# Patient Record
(demographics unavailable — no encounter records)

---

## 2025-06-13 NOTE — HISTORY OF PRESENT ILLNESS
[de-identified] : Initial Visit for Left knee pain  Reason:  no injury  Duration: 3 weeks  Prior Studies: X rays ordered  Medical Hx: Surgical Hx: N/A Aggravating FX: prolonged standing / sit to stand/ walking  Symptoms: Sharp  Pain Level: 8/10 Alleviating FX: Ice / heat  Pain Medication: none Current Med: none  Allergies: NKA

## 2025-06-13 NOTE — HISTORY OF PRESENT ILLNESS
[de-identified] : Initial Visit for Left knee pain  Reason:  no injury  Duration: 3 weeks  Prior Studies: X rays ordered  Medical Hx: Surgical Hx: N/A Aggravating FX: prolonged standing / sit to stand/ walking  Symptoms: Sharp  Pain Level: 8/10 Alleviating FX: Ice / heat  Pain Medication: none Current Med: none  Allergies: NKA

## 2025-06-13 NOTE — ASSESSMENT
[FreeTextEntry1] : Discussed at length with patient regarding symptoms and diagnosis.  Treatment options discussed and patient's questions answered and patient expresses understanding.  Patient offered physical therapy but declined as well as home exercises.  Patient to follow-up in office if persistent or recurrent symptoms.  Patient instructed to call if any questions or concerns.

## 2025-06-13 NOTE — PHYSICAL EXAM
[de-identified] : Left knee  Constitutional:  The patient is healthy-appearing and in no apparent distress.   Gait: The patient ambulates with a normal gait and no limp.  Cardiovascular System:  The capillary refill is less than 2 seconds.   Skin:  There are no skin abnormalities.  Left Knee:   Bony Palpation:  There is no tenderness of the medial joint line.  There is no tenderness of the lateral joint line. There is no tenderness of the medial femoral chondyle. There is no tenderness of the lateral femoral chondyle. There is no tenderness of the tibial tubercle. There is no tenderness of the superior patella. There is no tenderness of the inferior patella. There is tenderness of the medial patellar facet. There is tenderness of the lateral patellar facet.  Soft Tissue Palpation:  There is no tenderness of the medial retinaculum. There is no tenderness of the lateral retinaculum. There is no tenderness of the quadriceps tendon. There is no tenderness of the patella tendon. There is no tenderness of the ITB. There is no tenderness of the pes anserine.  Active Range of Motion:  The range of motion at the knee actively and passively is full.   Special Tests:  There is a negative Apley. There is a negative Steinmanns.  There is a negative Lachman and Anterior Drawer. There is a negative Posterior Drawer.   There is no varus or valgus laxity.  Strength:  There is 5/5 hip flexion and 5/5 knee flexion and extension.    Psychiatric:  The patient demonstrates a normal mood and affect and is active and alert.   [de-identified] : X-ray left knee 3 view:  There is no arthritis or fracture

## 2025-06-13 NOTE — PHYSICAL EXAM
[de-identified] : Left knee  Constitutional:  The patient is healthy-appearing and in no apparent distress.   Gait: The patient ambulates with a normal gait and no limp.  Cardiovascular System:  The capillary refill is less than 2 seconds.   Skin:  There are no skin abnormalities.  Left Knee:   Bony Palpation:  There is no tenderness of the medial joint line.  There is no tenderness of the lateral joint line. There is no tenderness of the medial femoral chondyle. There is no tenderness of the lateral femoral chondyle. There is no tenderness of the tibial tubercle. There is no tenderness of the superior patella. There is no tenderness of the inferior patella. There is tenderness of the medial patellar facet. There is tenderness of the lateral patellar facet.  Soft Tissue Palpation:  There is no tenderness of the medial retinaculum. There is no tenderness of the lateral retinaculum. There is no tenderness of the quadriceps tendon. There is no tenderness of the patella tendon. There is no tenderness of the ITB. There is no tenderness of the pes anserine.  Active Range of Motion:  The range of motion at the knee actively and passively is full.   Special Tests:  There is a negative Apley. There is a negative Steinmanns.  There is a negative Lachman and Anterior Drawer. There is a negative Posterior Drawer.   There is no varus or valgus laxity.  Strength:  There is 5/5 hip flexion and 5/5 knee flexion and extension.    Psychiatric:  The patient demonstrates a normal mood and affect and is active and alert.   [de-identified] : X-ray left knee 3 view:  There is no arthritis or fracture

## 2025-06-19 NOTE — PHYSICAL EXAM
[de-identified] : Left hip  Constitutional:  The patient is healthy-appearing and in no apparent distress.   Gait: The patient ambulates with a normal gait and no limp.  Cardiovascular System:  There is capillary refill less than 2 seconds.   Skin:  There is no skin abnormalities.  Lumbar Spine; There is no significance malalignment and no tenderness to the paraspinal musculature.  Left Hips:   Bony Palpation:  There is no tenderness of the iliac crest. There is no tenderness of the ASIS. There is no tenderness of the PSIS. There is no tenderness of the SI joint. There is no tenderness of the greater trochanter.   Soft Tissue Palpation:  There is no tenderness of the hip adductors. There is no tenderness of the hip abductors. There is no tenderness of the hamstrings.  There is no tenderness of the piriformis.  There is tenderness of the hip flexors.  Active Range of Motion:  There is full range of motion at the hip actively and passively.   Special Tests:  There is a negative Dara's test. There is a negative Michael-Stewart test.   Strength:  There is 5/5 hip flexion, adduction, abduction.    Psychiatric:  The patient demonstrates a normal mood and affect and is active and alert. [de-identified] : Insert x-ray left hip 2 view: There is no significant bony / soft tissue abnormality, arthritis, or fracture. Insert x-ray lumbar spine 2 view: There is mild L4-5 foraminal stenosis and loss of lordosis otherwise no significant abnormality

## 2025-06-19 NOTE — HISTORY OF PRESENT ILLNESS
[de-identified] : Patient is an established patient presenting with several months of left anterior hip discomfort and clicking he states has been doing home exercise and physical therapy for several months as he is a  with no significant improvement

## 2025-06-19 NOTE — PHYSICAL EXAM
[de-identified] : Left hip  Constitutional:  The patient is healthy-appearing and in no apparent distress.   Gait: The patient ambulates with a normal gait and no limp.  Cardiovascular System:  There is capillary refill less than 2 seconds.   Skin:  There is no skin abnormalities.  Lumbar Spine; There is no significance malalignment and no tenderness to the paraspinal musculature.  Left Hips:   Bony Palpation:  There is no tenderness of the iliac crest. There is no tenderness of the ASIS. There is no tenderness of the PSIS. There is no tenderness of the SI joint. There is no tenderness of the greater trochanter.   Soft Tissue Palpation:  There is no tenderness of the hip adductors. There is no tenderness of the hip abductors. There is no tenderness of the hamstrings.  There is no tenderness of the piriformis.  There is tenderness of the hip flexors.  Active Range of Motion:  There is full range of motion at the hip actively and passively.   Special Tests:  There is a negative Dara's test. There is a negative Michael-Stewart test.   Strength:  There is 5/5 hip flexion, adduction, abduction.    Psychiatric:  The patient demonstrates a normal mood and affect and is active and alert. [de-identified] : Insert x-ray left hip 2 view: There is no significant bony / soft tissue abnormality, arthritis, or fracture. Insert x-ray lumbar spine 2 view: There is mild L4-5 foraminal stenosis and loss of lordosis otherwise no significant abnormality

## 2025-06-19 NOTE — REASON FOR VISIT
[Follow-Up Visit] : a follow-up visit for [FreeTextEntry2] : Last Visit: 6/12/25 Reason: new reason :left hip pain Symptoms: clicking while putting legs together, discomfort, sensitivity Pain Level: 8/10 while in movement 1/10 when sitting still Physical therapy/ Home exercises: gym exercises

## 2025-06-19 NOTE — PHYSICAL EXAM
[de-identified] : Left hip  Constitutional:  The patient is healthy-appearing and in no apparent distress.   Gait: The patient ambulates with a normal gait and no limp.  Cardiovascular System:  There is capillary refill less than 2 seconds.   Skin:  There is no skin abnormalities.  Lumbar Spine; There is no significance malalignment and no tenderness to the paraspinal musculature.  Left Hips:   Bony Palpation:  There is no tenderness of the iliac crest. There is no tenderness of the ASIS. There is no tenderness of the PSIS. There is no tenderness of the SI joint. There is no tenderness of the greater trochanter.   Soft Tissue Palpation:  There is no tenderness of the hip adductors. There is no tenderness of the hip abductors. There is no tenderness of the hamstrings.  There is no tenderness of the piriformis.  There is tenderness of the hip flexors.  Active Range of Motion:  There is full range of motion at the hip actively and passively.   Special Tests:  There is a negative Dara's test. There is a negative Michael-Stewart test.   Strength:  There is 5/5 hip flexion, adduction, abduction.    Psychiatric:  The patient demonstrates a normal mood and affect and is active and alert. [de-identified] : Insert x-ray left hip 2 view: There is no significant bony / soft tissue abnormality, arthritis, or fracture. Insert x-ray lumbar spine 2 view: There is mild L4-5 foraminal stenosis and loss of lordosis otherwise no significant abnormality

## 2025-06-19 NOTE — HISTORY OF PRESENT ILLNESS
[de-identified] : Patient is an established patient presenting with several months of left anterior hip discomfort and clicking he states has been doing home exercise and physical therapy for several months as he is a  with no significant improvement

## 2025-06-19 NOTE — HISTORY OF PRESENT ILLNESS
[de-identified] : Patient is an established patient presenting with several months of left anterior hip discomfort and clicking he states has been doing home exercise and physical therapy for several months as he is a  with no significant improvement

## 2025-07-14 NOTE — PHYSICAL EXAM
[de-identified] : Left hip  Constitutional:  The patient is healthy-appearing and in no apparent distress.   Gait: The patient ambulates with a normal gait and no limp.  Cardiovascular System:  There is capillary refill less than 2 seconds.   Skin:  There is no skin abnormalities.  Lumbar Spine; There is no significance malalignment and no tenderness to the paraspinal musculature.  Left Hips:   Bony Palpation:  There is no tenderness of the iliac crest. There is no tenderness of the ASIS. There is no tenderness of the PSIS. There is no tenderness of the SI joint. There is no tenderness of the greater trochanter.   Soft Tissue Palpation:  There is no tenderness of the hip adductors. There is no tenderness of the hip abductors. There is no tenderness of the hamstrings.  There is no tenderness of the piriformis.  There is tenderness of the hip flexors.  Active Range of Motion:  There is full range of motion at the hip actively and passively.   Special Tests:  There is a negative Dara's test. There is a negative Michael-Stewart test.   Strength:  There is 5/5 hip flexion, adduction, abduction.    Left knee  Gait: The patient ambulates with a normal gait and no limp.  Cardiovascular System:  The capillary refill is less than 2 seconds.   Skin:  There are no skin abnormalities.  Left Knee:   Bony Palpation:  There is no tenderness of the medial joint line.  There is no tenderness of the lateral joint line. There is no tenderness of the medial femoral chondyle. There is no tenderness of the lateral femoral chondyle. There is no tenderness of the tibial tubercle. There is no tenderness of the superior patella. There is no tenderness of the inferior patella. There is tenderness of the medial patellar facet. There is tenderness of the lateral patellar facet.  Soft Tissue Palpation:  There is no tenderness of the medial retinaculum. There is no tenderness of the lateral retinaculum. There is no tenderness of the quadriceps tendon. There is no tenderness of the patella tendon. There is no tenderness of the ITB. There is no tenderness of the pes anserine.  Active Range of Motion:  The range of motion at the knee actively and passively is full.   Special Tests:  There is a negative Apley. There is a negative Steinmanns.  There is a negative Lachman and Anterior Drawer. There is a negative Posterior Drawer.   There is no varus or valgus laxity.  Strength:  There is 5/5 hip flexion and 5/5 knee flexion and extension.    Psychiatric:  The patient demonstrates a normal mood and affect and is active and alert.    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

## 2025-07-14 NOTE — ASSESSMENT
[FreeTextEntry1] : Patient has previously tried rest, activity modification, exercises, and medications (ie. anti-inflammatories, such as Ibuprofen or Tylenol) without improvement.  Patient has previously had x-ray evaluation.  Given persistent symptoms, a formal request is made for an MRI of the left knee